# Patient Record
Sex: FEMALE | Race: BLACK OR AFRICAN AMERICAN | NOT HISPANIC OR LATINO | URBAN - METROPOLITAN AREA
[De-identification: names, ages, dates, MRNs, and addresses within clinical notes are randomized per-mention and may not be internally consistent; named-entity substitution may affect disease eponyms.]

---

## 2023-08-24 ENCOUNTER — OFFICE VISIT (OUTPATIENT)
Dept: PRIMARY CARE | Facility: CLINIC | Age: 58
End: 2023-08-24
Payer: COMMERCIAL

## 2023-08-24 VITALS
BODY MASS INDEX: 25.59 KG/M2 | HEART RATE: 68 BPM | HEIGHT: 64 IN | WEIGHT: 149.9 LBS | DIASTOLIC BLOOD PRESSURE: 74 MMHG | SYSTOLIC BLOOD PRESSURE: 116 MMHG

## 2023-08-24 DIAGNOSIS — E78.2 MIXED HYPERLIPIDEMIA: ICD-10-CM

## 2023-08-24 DIAGNOSIS — M81.0 AGE-RELATED OSTEOPOROSIS WITHOUT CURRENT PATHOLOGICAL FRACTURE: Primary | ICD-10-CM

## 2023-08-24 DIAGNOSIS — Z13.29 SCREENING FOR THYROID DISORDER: ICD-10-CM

## 2023-08-24 DIAGNOSIS — Z00.00 ROUTINE GENERAL MEDICAL EXAMINATION AT A HEALTH CARE FACILITY: ICD-10-CM

## 2023-08-24 DIAGNOSIS — R73.03 PREDIABETES: ICD-10-CM

## 2023-08-24 PROCEDURE — 1036F TOBACCO NON-USER: CPT | Performed by: INTERNAL MEDICINE

## 2023-08-24 PROCEDURE — 99386 PREV VISIT NEW AGE 40-64: CPT | Performed by: INTERNAL MEDICINE

## 2023-08-24 RX ORDER — CHOLECALCIFEROL (VITAMIN D3) 50 MCG
50 TABLET ORAL DAILY
COMMUNITY

## 2023-08-24 RX ORDER — CALCIUM CARBONATE 500(1250)
1 TABLET ORAL
COMMUNITY

## 2023-08-24 ASSESSMENT — ENCOUNTER SYMPTOMS
EYE ITCHING: 0
EYE PAIN: 0
TREMORS: 0
NUMBNESS: 0
PHOTOPHOBIA: 0
DIARRHEA: 0
DYSPHORIC MOOD: 0
COUGH: 0
UNEXPECTED WEIGHT CHANGE: 0
CONFUSION: 0
SORE THROAT: 0
NAUSEA: 0
AGITATION: 0
ADENOPATHY: 0
BLOOD IN STOOL: 0
HEADACHES: 0
HEMATURIA: 0
JOINT SWELLING: 0
SINUS PAIN: 0
FACIAL SWELLING: 0
POLYDIPSIA: 0
TROUBLE SWALLOWING: 0
ABDOMINAL PAIN: 0
STRIDOR: 0
ROS SKIN COMMENTS: DRY PATCHES
RECTAL PAIN: 0
APNEA: 0
SLEEP DISTURBANCE: 0
NECK STIFFNESS: 0
WHEEZING: 0
POLYPHAGIA: 0
CHILLS: 0
DYSURIA: 0
EYE DISCHARGE: 0
DIFFICULTY URINATING: 0
EYE REDNESS: 0
PALPITATIONS: 0
ACTIVITY CHANGE: 0
DIZZINESS: 0
NERVOUS/ANXIOUS: 0
FATIGUE: 0
WEAKNESS: 0
LIGHT-HEADEDNESS: 0
DECREASED CONCENTRATION: 0
MYALGIAS: 0
SINUS PRESSURE: 0
CHEST TIGHTNESS: 0
WOUND: 0
SPEECH DIFFICULTY: 0
ANAL BLEEDING: 0
BRUISES/BLEEDS EASILY: 0
SHORTNESS OF BREATH: 0
ABDOMINAL DISTENTION: 0
APPETITE CHANGE: 0
CONSTIPATION: 0
FEVER: 0
HYPERACTIVE: 0
FACIAL ASYMMETRY: 0
RHINORRHEA: 0
FREQUENCY: 0
HALLUCINATIONS: 0
ARTHRALGIAS: 0
VOICE CHANGE: 0
COLOR CHANGE: 0
BACK PAIN: 0
FLANK PAIN: 0
VOMITING: 0
NECK PAIN: 0
CHOKING: 0
SEIZURES: 0
DIAPHORESIS: 0

## 2023-08-24 NOTE — PROGRESS NOTES
Subjective   Patient ID: Nilsa Boyce is a 57 y.o. female who presents for Establish Care.    HPI    Pt here to reestablish care.  She is now a school counselor instead of a teacher.  She was a former patient of mine through [facility].  She has since seen another PCP.      She had bone density in June of 2023 and was found to have osteoporosis.  She was told to take calcium and vitamin D.  She had this ordered through GYN.      She had mammogram in June that was normal.  She has no breast issues.    She had colonoscopy done few years back by group at [facility].  No current GI issues.      Review of Systems   Constitutional:  Negative for activity change, appetite change, chills, diaphoresis, fatigue, fever and unexpected weight change.   HENT:  Positive for congestion and postnasal drip. Negative for dental problem, drooling, ear discharge, ear pain, facial swelling, hearing loss, mouth sores, nosebleeds, rhinorrhea, sinus pressure, sinus pain, sneezing, sore throat, tinnitus, trouble swallowing and voice change.    Eyes:  Positive for visual disturbance (wears glasses-up to date on exam). Negative for photophobia, pain, discharge, redness and itching.   Respiratory:  Negative for apnea, cough, choking, chest tightness, shortness of breath, wheezing and stridor.    Cardiovascular:  Negative for chest pain, palpitations and leg swelling.   Gastrointestinal:  Negative for abdominal distention, abdominal pain, anal bleeding, blood in stool, constipation, diarrhea, nausea, rectal pain and vomiting.   Endocrine: Negative for cold intolerance, heat intolerance, polydipsia, polyphagia and polyuria.   Genitourinary:  Negative for decreased urine volume, difficulty urinating, dyspareunia, dysuria, enuresis, flank pain, frequency, genital sores, hematuria, menstrual problem, pelvic pain, urgency, vaginal bleeding, vaginal discharge and vaginal pain.   Musculoskeletal:  Negative for arthralgias, back pain, gait problem,  "joint swelling, myalgias, neck pain and neck stiffness.   Skin:  Negative for color change, pallor, rash and wound.        Dry patches    Allergic/Immunologic: Negative for environmental allergies, food allergies and immunocompromised state.   Neurological:  Negative for dizziness, tremors, seizures, syncope, facial asymmetry, speech difficulty, weakness, light-headedness, numbness and headaches.   Hematological:  Negative for adenopathy. Does not bruise/bleed easily.   Psychiatric/Behavioral:  Negative for agitation, behavioral problems, confusion, decreased concentration, dysphoric mood, hallucinations, self-injury, sleep disturbance and suicidal ideas. The patient is not nervous/anxious and is not hyperactive.        Objective   /74 (BP Location: Left arm, Patient Position: Sitting)   Pulse 68   Ht 1.626 m (5' 4\")   Wt 68 kg (149 lb 14.4 oz)   BMI 25.73 kg/m²    Physical Exam  Constitutional:       Appearance: Normal appearance.   HENT:      Head: Normocephalic and atraumatic.      Right Ear: Tympanic membrane, ear canal and external ear normal. There is no impacted cerumen.      Left Ear: Tympanic membrane, ear canal and external ear normal. There is no impacted cerumen.      Nose: Nose normal. No congestion or rhinorrhea.      Mouth/Throat:      Mouth: Mucous membranes are moist.      Pharynx: Oropharynx is clear. No oropharyngeal exudate or posterior oropharyngeal erythema.   Eyes:      Extraocular Movements: Extraocular movements intact.      Conjunctiva/sclera: Conjunctivae normal.      Pupils: Pupils are equal, round, and reactive to light.   Neck:      Vascular: No carotid bruit.   Cardiovascular:      Rate and Rhythm: Normal rate and regular rhythm.      Pulses: Normal pulses.      Heart sounds: Normal heart sounds. No murmur heard.  Pulmonary:      Effort: Pulmonary effort is normal. No respiratory distress.      Breath sounds: Normal breath sounds. No wheezing, rhonchi or rales.   Abdominal: "      General: Abdomen is flat. Bowel sounds are normal. There is no distension.      Palpations: Abdomen is soft.      Tenderness: There is no abdominal tenderness.      Hernia: No hernia is present.   Musculoskeletal:         General: No swelling or tenderness. Normal range of motion.      Cervical back: Normal range of motion and neck supple.      Right lower leg: No edema.      Left lower leg: No edema.   Lymphadenopathy:      Cervical: No cervical adenopathy.   Skin:     General: Skin is warm and dry.      Findings: No lesion or rash.   Neurological:      General: No focal deficit present.      Mental Status: She is alert and oriented to person, place, and time.      Cranial Nerves: No cranial nerve deficit.      Sensory: No sensory deficit.      Motor: No weakness.   Psychiatric:         Mood and Affect: Mood normal.         Behavior: Behavior normal.         Thought Content: Thought content normal.         Judgment: Judgment normal.         Assessment/Plan   Problem List Items Addressed This Visit       Age-related osteoporosis without current pathological fracture - Primary     Bone density was in June of 2023 and noted to have osteoporosis  Only told to take calcium and vitamin d  Discussed use of Bisphosphonates; she will consider  Weight based exercises advised   Believes she went through menopause early          Relevant Orders    Vitamin D 25-Hydroxy,Total (for eval of Vitamin D levels)    Prediabetes     Trying to regulate with diet          Relevant Orders    Hemoglobin A1C    Mixed hyperlipidemia    Relevant Orders    Lipid Panel    Routine general medical examination at a health care facility     We will work to get copies of colonoscopy  Encouraged her to continue healthy diet and exercise  Sees GYN annually   Up to date on mammogram          Relevant Orders    Comprehensive Metabolic Panel    CBC    Urinalysis with Reflex Microscopic    Follow Up In Primary Care - Health Maintenance     Other Visit  Diagnoses       Screening for thyroid disorder        Relevant Orders    TSH

## 2023-08-24 NOTE — PATIENT INSTRUCTIONS
Consider starting bisphosphonate therapy such as Fosamax due to osteoporosis (brittle bone); if you choose to start this please me know   Continue taking calcium 1200 mg/day and vitamin D 2000 units/day   Weight based exercises are helpful to bone health  Work hard on healthy diet and exercise regularly-goal of 150 minutes week  We will work to get records of colonoscopy   Continue to see Gyn annually and have mammograms done when due   Flu and covid boosters come fall if interested  Follow up here in 1 year-sooner as needed

## 2023-08-24 NOTE — ASSESSMENT & PLAN NOTE
We will work to get copies of colonoscopy  Encouraged her to continue healthy diet and exercise  Sees GYN annually   Up to date on mammogram

## 2023-08-24 NOTE — ASSESSMENT & PLAN NOTE
Bone density was in June of 2023 and noted to have osteoporosis  Only told to take calcium and vitamin d  Discussed use of Bisphosphonates; she will consider  Weight based exercises advised   Believes she went through menopause early

## 2023-10-16 PROBLEM — L30.9 ACUTE ECZEMA: Status: ACTIVE | Noted: 2019-01-11

## 2023-10-16 PROBLEM — F43.29 STRESS AND ADJUSTMENT REACTION: Status: ACTIVE | Noted: 2019-01-11

## 2023-10-16 PROBLEM — E78.5 DYSLIPIDEMIA: Status: ACTIVE | Noted: 2023-10-16

## 2023-10-16 PROBLEM — L29.9 GENERALIZED PRURITUS: Status: ACTIVE | Noted: 2019-01-11

## 2023-10-16 RX ORDER — TRIAMCINOLONE ACETONIDE 1 MG/G
CREAM TOPICAL EVERY 12 HOURS
COMMUNITY
End: 2024-01-09 | Stop reason: WASHOUT

## 2023-10-16 RX ORDER — NAPROXEN 500 MG/1
500 TABLET ORAL
COMMUNITY
End: 2024-01-09 | Stop reason: WASHOUT

## 2023-10-17 ENCOUNTER — CLINICAL SUPPORT (OUTPATIENT)
Dept: PRIMARY CARE | Facility: CLINIC | Age: 58
End: 2023-10-17
Payer: COMMERCIAL

## 2023-10-17 DIAGNOSIS — Z23 NEED FOR INFLUENZA VACCINATION: ICD-10-CM

## 2023-10-17 PROCEDURE — 90471 IMMUNIZATION ADMIN: CPT | Performed by: INTERNAL MEDICINE

## 2023-10-17 PROCEDURE — 90686 IIV4 VACC NO PRSV 0.5 ML IM: CPT | Performed by: INTERNAL MEDICINE

## 2023-10-19 ENCOUNTER — APPOINTMENT (OUTPATIENT)
Dept: PRIMARY CARE | Facility: CLINIC | Age: 58
End: 2023-10-19
Payer: COMMERCIAL

## 2023-12-06 ENCOUNTER — LAB (OUTPATIENT)
Dept: LAB | Facility: LAB | Age: 58
End: 2023-12-06
Payer: COMMERCIAL

## 2023-12-06 DIAGNOSIS — R73.03 PREDIABETES: ICD-10-CM

## 2023-12-06 DIAGNOSIS — M81.0 AGE-RELATED OSTEOPOROSIS WITHOUT CURRENT PATHOLOGICAL FRACTURE: ICD-10-CM

## 2023-12-06 DIAGNOSIS — E78.2 MIXED HYPERLIPIDEMIA: ICD-10-CM

## 2023-12-06 DIAGNOSIS — Z00.00 ROUTINE GENERAL MEDICAL EXAMINATION AT A HEALTH CARE FACILITY: ICD-10-CM

## 2023-12-06 DIAGNOSIS — Z13.29 SCREENING FOR THYROID DISORDER: ICD-10-CM

## 2023-12-06 LAB
25(OH)D3 SERPL-MCNC: 31 NG/ML (ref 30–100)
ALBUMIN SERPL BCP-MCNC: 4.3 G/DL (ref 3.4–5)
ALP SERPL-CCNC: 83 U/L (ref 33–110)
ALT SERPL W P-5'-P-CCNC: 23 U/L (ref 7–45)
ANION GAP SERPL CALC-SCNC: 15 MMOL/L (ref 10–20)
AST SERPL W P-5'-P-CCNC: 19 U/L (ref 9–39)
BILIRUB SERPL-MCNC: 0.6 MG/DL (ref 0–1.2)
BUN SERPL-MCNC: 15 MG/DL (ref 6–23)
CALCIUM SERPL-MCNC: 9.6 MG/DL (ref 8.6–10.6)
CHLORIDE SERPL-SCNC: 105 MMOL/L (ref 98–107)
CHOLEST SERPL-MCNC: 231 MG/DL (ref 0–199)
CHOLESTEROL/HDL RATIO: 3.9
CO2 SERPL-SCNC: 29 MMOL/L (ref 21–32)
CREAT SERPL-MCNC: 0.86 MG/DL (ref 0.5–1.05)
ERYTHROCYTE [DISTWIDTH] IN BLOOD BY AUTOMATED COUNT: 14.2 % (ref 11.5–14.5)
EST. AVERAGE GLUCOSE BLD GHB EST-MCNC: 117 MG/DL
GFR SERPL CREATININE-BSD FRML MDRD: 79 ML/MIN/1.73M*2
GLUCOSE SERPL-MCNC: 76 MG/DL (ref 74–99)
HBA1C MFR BLD: 5.7 %
HCT VFR BLD AUTO: 45.6 % (ref 36–46)
HDLC SERPL-MCNC: 60 MG/DL
HGB BLD-MCNC: 14.6 G/DL (ref 12–16)
LDLC SERPL CALC-MCNC: 156 MG/DL
MCH RBC QN AUTO: 28.6 PG (ref 26–34)
MCHC RBC AUTO-ENTMCNC: 32 G/DL (ref 32–36)
MCV RBC AUTO: 89 FL (ref 80–100)
NON HDL CHOLESTEROL: 171 MG/DL (ref 0–149)
NRBC BLD-RTO: 0 /100 WBCS (ref 0–0)
PLATELET # BLD AUTO: 266 X10*3/UL (ref 150–450)
POTASSIUM SERPL-SCNC: 4.5 MMOL/L (ref 3.5–5.3)
PROT SERPL-MCNC: 7.5 G/DL (ref 6.4–8.2)
RBC # BLD AUTO: 5.11 X10*6/UL (ref 4–5.2)
SODIUM SERPL-SCNC: 144 MMOL/L (ref 136–145)
TRIGL SERPL-MCNC: 75 MG/DL (ref 0–149)
TSH SERPL-ACNC: 1.49 MIU/L (ref 0.44–3.98)
VLDL: 15 MG/DL (ref 0–40)
WBC # BLD AUTO: 4.9 X10*3/UL (ref 4.4–11.3)

## 2023-12-06 PROCEDURE — 83036 HEMOGLOBIN GLYCOSYLATED A1C: CPT

## 2023-12-06 PROCEDURE — 36415 COLL VENOUS BLD VENIPUNCTURE: CPT

## 2023-12-06 PROCEDURE — 82306 VITAMIN D 25 HYDROXY: CPT

## 2023-12-06 PROCEDURE — 84443 ASSAY THYROID STIM HORMONE: CPT

## 2023-12-06 PROCEDURE — 85027 COMPLETE CBC AUTOMATED: CPT

## 2023-12-06 PROCEDURE — 80053 COMPREHEN METABOLIC PANEL: CPT

## 2023-12-06 PROCEDURE — 80061 LIPID PANEL: CPT

## 2023-12-06 PROCEDURE — 81003 URINALYSIS AUTO W/O SCOPE: CPT

## 2023-12-07 LAB
APPEARANCE UR: CLEAR
BILIRUB UR STRIP.AUTO-MCNC: NEGATIVE MG/DL
COLOR UR: YELLOW
GLUCOSE UR STRIP.AUTO-MCNC: NEGATIVE MG/DL
KETONES UR STRIP.AUTO-MCNC: NEGATIVE MG/DL
LEUKOCYTE ESTERASE UR QL STRIP.AUTO: NEGATIVE
NITRITE UR QL STRIP.AUTO: NEGATIVE
PH UR STRIP.AUTO: 5 [PH]
PROT UR STRIP.AUTO-MCNC: NEGATIVE MG/DL
RBC # UR STRIP.AUTO: NEGATIVE /UL
SP GR UR STRIP.AUTO: 1.01
UROBILINOGEN UR STRIP.AUTO-MCNC: <2 MG/DL

## 2023-12-08 ENCOUNTER — TELEPHONE (OUTPATIENT)
Dept: PRIMARY CARE | Facility: CLINIC | Age: 58
End: 2023-12-08
Payer: COMMERCIAL

## 2023-12-08 DIAGNOSIS — E78.2 MIXED HYPERLIPIDEMIA: ICD-10-CM

## 2023-12-11 ENCOUNTER — TELEPHONE (OUTPATIENT)
Dept: PRIMARY CARE | Facility: CLINIC | Age: 58
End: 2023-12-11
Payer: COMMERCIAL

## 2023-12-11 DIAGNOSIS — U07.1 COVID: Primary | ICD-10-CM

## 2023-12-11 NOTE — TELEPHONE ENCOUNTER
She tested positive for COVID yesterday, sore throat, chills, headache, started yesterday. Would like Paxlovid. Send to pharmacy.

## 2024-01-09 ENCOUNTER — OFFICE VISIT (OUTPATIENT)
Dept: PRIMARY CARE | Facility: CLINIC | Age: 59
End: 2024-01-09
Payer: COMMERCIAL

## 2024-01-09 VITALS — DIASTOLIC BLOOD PRESSURE: 78 MMHG | SYSTOLIC BLOOD PRESSURE: 118 MMHG | HEART RATE: 72 BPM

## 2024-01-09 DIAGNOSIS — H93.8X3 EAR POPPING, BILATERAL: ICD-10-CM

## 2024-01-09 DIAGNOSIS — R25.2 CRAMP OF TOE: ICD-10-CM

## 2024-01-09 DIAGNOSIS — D17.1 LIPOMA OF BACK: Primary | ICD-10-CM

## 2024-01-09 PROCEDURE — 99213 OFFICE O/P EST LOW 20 MIN: CPT | Performed by: INTERNAL MEDICINE

## 2024-01-09 PROCEDURE — 1036F TOBACCO NON-USER: CPT | Performed by: INTERNAL MEDICINE

## 2024-01-09 ASSESSMENT — ENCOUNTER SYMPTOMS
SINUS PRESSURE: 0
RHINORRHEA: 0
SINUS PAIN: 0

## 2024-01-09 NOTE — PROGRESS NOTES
Subjective   Patient ID: Nilsa Boyce is a 58 y.o. female who presents for Mass (Right upper back which was found end of December 2023 while getting a massage).    HPI  Pt here in acute visit.  She went for deep tissue massage in 12/2023 and her massage therapist noted a lump of the right upper back.  Pt was unaware of this.  She has no pain to area.    She does note some right toes that cramp up/lock up.  Lasts few seconds and then resolves.      She also has some popping of ears, feels as if she just got off a plane.  She noted this for last few weeks.        Review of Systems   HENT:  Negative for congestion, ear discharge, ear pain, postnasal drip, rhinorrhea, sinus pressure and sinus pain.         Ear popping    Musculoskeletal:         Cramping of foot/toes    Skin:         Back lesion        Objective   /78 (BP Location: Left arm, Patient Position: Sitting)   Pulse 72    Physical Exam  Constitutional:       Appearance: Normal appearance.   HENT:      Right Ear: Tympanic membrane normal.      Left Ear: Tympanic membrane normal.      Nose: Nose normal.      Mouth/Throat:      Mouth: Mucous membranes are dry.      Pharynx: Oropharynx is clear.   Musculoskeletal:         General: No swelling, tenderness, deformity or signs of injury. Normal range of motion.      Right lower leg: No edema.      Left lower leg: No edema.   Skin:     Comments: + quarter size lipoma noted upper back on right side that is freely mobile, soft, non tender    Neurological:      Mental Status: She is alert and oriented to person, place, and time.   Psychiatric:         Mood and Affect: Mood normal.         Assessment/Plan   Problem List Items Addressed This Visit       Lipoma of back - Primary     Explained to pt this is benign collection of fat subcutaneously  Nothing needs done unless it bothers her  Will just monitor         Ear popping, bilateral     Recommend flonase use over next few weeks         Cramp of toe      Stretch the area as able  No abnormalities noted on exam

## 2024-01-09 NOTE — ASSESSMENT & PLAN NOTE
Explained to pt this is benign collection of fat subcutaneously  Nothing needs done unless it bothers her  Will just monitor

## 2024-01-09 NOTE — PATIENT INSTRUCTIONS
Skin lesion on back is known as a lipoma-it is benign; we do nothing with it unless it grows and becomes bothersome   Toe cramping can occur when tendons are not adequately stretched, just work on stretching area as able  For ears start nasal corticosteroid such as Flonase-2 sprays each nostril twice a day for 1 week then can reduce to 1 time a day use and then as needed-due to pressure changes/sinuses with current weather variation  Follow up as needed

## 2024-01-26 ENCOUNTER — HOSPITAL ENCOUNTER (OUTPATIENT)
Dept: RADIOLOGY | Facility: CLINIC | Age: 59
Discharge: HOME | End: 2024-01-26
Payer: COMMERCIAL

## 2024-01-26 DIAGNOSIS — E78.2 MIXED HYPERLIPIDEMIA: ICD-10-CM

## 2024-01-26 PROCEDURE — 75571 CT HRT W/O DYE W/CA TEST: CPT

## 2024-08-20 ENCOUNTER — APPOINTMENT (OUTPATIENT)
Dept: PRIMARY CARE | Facility: CLINIC | Age: 59
End: 2024-08-20
Payer: COMMERCIAL

## 2024-08-20 VITALS
BODY MASS INDEX: 27.06 KG/M2 | HEIGHT: 64 IN | HEART RATE: 74 BPM | WEIGHT: 158.5 LBS | RESPIRATION RATE: 16 BRPM | DIASTOLIC BLOOD PRESSURE: 60 MMHG | SYSTOLIC BLOOD PRESSURE: 102 MMHG

## 2024-08-20 DIAGNOSIS — E78.2 MIXED HYPERLIPIDEMIA: ICD-10-CM

## 2024-08-20 DIAGNOSIS — R35.0 URINARY FREQUENCY: ICD-10-CM

## 2024-08-20 DIAGNOSIS — K59.00 CONSTIPATION, UNSPECIFIED CONSTIPATION TYPE: ICD-10-CM

## 2024-08-20 DIAGNOSIS — Z12.11 SCREENING FOR COLORECTAL CANCER: ICD-10-CM

## 2024-08-20 DIAGNOSIS — Z12.12 SCREENING FOR COLORECTAL CANCER: ICD-10-CM

## 2024-08-20 DIAGNOSIS — Z00.00 ROUTINE GENERAL MEDICAL EXAMINATION AT A HEALTH CARE FACILITY: Primary | ICD-10-CM

## 2024-08-20 DIAGNOSIS — M81.0 AGE-RELATED OSTEOPOROSIS WITHOUT CURRENT PATHOLOGICAL FRACTURE: ICD-10-CM

## 2024-08-20 DIAGNOSIS — R73.03 PREDIABETES: ICD-10-CM

## 2024-08-20 DIAGNOSIS — Z13.29 SCREENING FOR THYROID DISORDER: ICD-10-CM

## 2024-08-20 DIAGNOSIS — E66.3 OVERWEIGHT WITH BODY MASS INDEX (BMI) OF 27 TO 27.9 IN ADULT: ICD-10-CM

## 2024-08-20 PROBLEM — R25.2 CRAMP OF TOE: Status: RESOLVED | Noted: 2024-01-09 | Resolved: 2024-08-20

## 2024-08-20 PROBLEM — L29.9 GENERALIZED PRURITUS: Status: RESOLVED | Noted: 2019-01-11 | Resolved: 2024-08-20

## 2024-08-20 PROBLEM — F43.29 STRESS AND ADJUSTMENT REACTION: Status: RESOLVED | Noted: 2019-01-11 | Resolved: 2024-08-20

## 2024-08-20 PROBLEM — L30.9 ACUTE ECZEMA: Status: RESOLVED | Noted: 2019-01-11 | Resolved: 2024-08-20

## 2024-08-20 PROCEDURE — 1036F TOBACCO NON-USER: CPT | Performed by: INTERNAL MEDICINE

## 2024-08-20 PROCEDURE — 99396 PREV VISIT EST AGE 40-64: CPT | Performed by: INTERNAL MEDICINE

## 2024-08-20 PROCEDURE — 3008F BODY MASS INDEX DOCD: CPT | Performed by: INTERNAL MEDICINE

## 2024-08-20 ASSESSMENT — ENCOUNTER SYMPTOMS
APNEA: 0
SORE THROAT: 0
NUMBNESS: 0
RECTAL PAIN: 0
JOINT SWELLING: 0
TREMORS: 0
CHOKING: 0
ABDOMINAL PAIN: 0
DIAPHORESIS: 0
DIZZINESS: 0
CONFUSION: 0
ADENOPATHY: 0
MYALGIAS: 0
SLEEP DISTURBANCE: 0
STRIDOR: 0
EYE ITCHING: 0
APPETITE CHANGE: 0
HALLUCINATIONS: 0
CONSTIPATION: 1
EYE PAIN: 0
SEIZURES: 0
DYSPHORIC MOOD: 0
LIGHT-HEADEDNESS: 0
SINUS PAIN: 0
VOMITING: 0
PHOTOPHOBIA: 0
NERVOUS/ANXIOUS: 0
DIARRHEA: 0
HYPERACTIVE: 0
DECREASED CONCENTRATION: 0
FREQUENCY: 1
FLANK PAIN: 0
NECK PAIN: 1
HEADACHES: 0
DIFFICULTY URINATING: 0
WEAKNESS: 0
WOUND: 0
PALPITATIONS: 0
SINUS PRESSURE: 0
FEVER: 0
COLOR CHANGE: 0
FACIAL SWELLING: 0
SHORTNESS OF BREATH: 0
BRUISES/BLEEDS EASILY: 0
RHINORRHEA: 0
FATIGUE: 0
SPEECH DIFFICULTY: 0
ABDOMINAL DISTENTION: 0
ACTIVITY CHANGE: 0
HEMATURIA: 0
NECK STIFFNESS: 0
CHILLS: 0
EYE REDNESS: 0
BACK PAIN: 0
WHEEZING: 0
VOICE CHANGE: 0
ANAL BLEEDING: 0
AGITATION: 0
EYE DISCHARGE: 0
COUGH: 0
NAUSEA: 0
FACIAL ASYMMETRY: 0
UNEXPECTED WEIGHT CHANGE: 0
BLOOD IN STOOL: 0
DYSURIA: 0
ARTHRALGIAS: 1
CHEST TIGHTNESS: 0
TROUBLE SWALLOWING: 0
POLYPHAGIA: 0
POLYDIPSIA: 0

## 2024-08-20 ASSESSMENT — PROMIS GLOBAL HEALTH SCALE
EMOTIONAL_PROBLEMS: SOMETIMES
RATE_AVERAGE_FATIGUE: MILD
CARRYOUT_SOCIAL_ACTIVITIES: VERY GOOD
RATE_SOCIAL_SATISFACTION: VERY GOOD
RATE_GENERAL_HEALTH: VERY GOOD
RATE_MENTAL_HEALTH: GOOD
RATE_PHYSICAL_HEALTH: VERY GOOD
RATE_AVERAGE_PAIN: 1
CARRYOUT_PHYSICAL_ACTIVITIES: COMPLETELY
RATE_QUALITY_OF_LIFE: VERY GOOD

## 2024-08-20 NOTE — PATIENT INSTRUCTIONS
Start doing Metamucil daily to help move bowels more regularly and completely  Continue calcium and vitamin D for now for bone health, avoid large consumption of caffeine/alcohol as these strip bone further; exercise regularly   Get fasting blood work and urine done when able-orders are in-stop into any    Consider flu/covid boosters come fall if interested  See GYN annually and have mammogram as directed  Call and arrange to get back in to GI for repeat colonoscopy   Follow up in 1 year

## 2024-08-20 NOTE — ASSESSMENT & PLAN NOTE
Pt was noted to have -porosis of spine only  She was noted to have -penia in other two areas  For now will continue calcium and vitamin D but did discuss at length possibly need for bisphosphonate therapy in near future  Dexa will be due again in 2024  She denies early menopause/height loss/or family history of osteoporosis
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
## 2024-08-28 ENCOUNTER — APPOINTMENT (OUTPATIENT)
Dept: PRIMARY CARE | Facility: CLINIC | Age: 59
End: 2024-08-28
Payer: COMMERCIAL

## 2024-09-09 ENCOUNTER — TELEPHONE (OUTPATIENT)
Dept: PRIMARY CARE | Facility: CLINIC | Age: 59
End: 2024-09-09
Payer: COMMERCIAL

## 2024-09-09 DIAGNOSIS — U07.1 COVID: Primary | ICD-10-CM

## 2024-09-09 RX ORDER — NIRMATRELVIR AND RITONAVIR 300-100 MG
3 KIT ORAL 2 TIMES DAILY
Qty: 30 TABLET | Refills: 0 | Status: SHIPPED | OUTPATIENT
Start: 2024-09-09 | End: 2024-09-14

## 2024-09-09 NOTE — TELEPHONE ENCOUNTER
Blessingjunaid tested Positive for COVID yesterday morning, she has congestion, cough headache, sore throat runny nose, she is asking for Paxlovid.     Please advise

## 2024-09-09 NOTE — TELEPHONE ENCOUNTER
I sent it into pharmacy  May cause diarrhea  Some get rebound symptoms once they are done with use

## 2024-10-07 ENCOUNTER — LAB (OUTPATIENT)
Dept: LAB | Facility: LAB | Age: 59
End: 2024-10-07
Payer: COMMERCIAL

## 2024-10-07 DIAGNOSIS — Z13.29 SCREENING FOR THYROID DISORDER: ICD-10-CM

## 2024-10-07 DIAGNOSIS — Z00.00 ROUTINE GENERAL MEDICAL EXAMINATION AT A HEALTH CARE FACILITY: ICD-10-CM

## 2024-10-07 DIAGNOSIS — R73.03 PREDIABETES: ICD-10-CM

## 2024-10-07 DIAGNOSIS — E78.2 MIXED HYPERLIPIDEMIA: ICD-10-CM

## 2024-10-07 DIAGNOSIS — R35.0 URINARY FREQUENCY: ICD-10-CM

## 2024-10-07 LAB
ALBUMIN SERPL BCP-MCNC: 4.4 G/DL (ref 3.4–5)
ALP SERPL-CCNC: 80 U/L (ref 33–110)
ALT SERPL W P-5'-P-CCNC: 25 U/L (ref 7–45)
ANION GAP SERPL CALC-SCNC: 9 MMOL/L (ref 10–20)
AST SERPL W P-5'-P-CCNC: 21 U/L (ref 9–39)
BILIRUB SERPL-MCNC: 0.5 MG/DL (ref 0–1.2)
BUN SERPL-MCNC: 15 MG/DL (ref 6–23)
CALCIUM SERPL-MCNC: 9.7 MG/DL (ref 8.6–10.6)
CHLORIDE SERPL-SCNC: 107 MMOL/L (ref 98–107)
CHOLEST SERPL-MCNC: 224 MG/DL (ref 0–199)
CHOLESTEROL/HDL RATIO: 4
CO2 SERPL-SCNC: 28 MMOL/L (ref 21–32)
CREAT SERPL-MCNC: 0.82 MG/DL (ref 0.5–1.05)
EGFRCR SERPLBLD CKD-EPI 2021: 84 ML/MIN/1.73M*2
ERYTHROCYTE [DISTWIDTH] IN BLOOD BY AUTOMATED COUNT: 14.1 % (ref 11.5–14.5)
EST. AVERAGE GLUCOSE BLD GHB EST-MCNC: 126 MG/DL
GLUCOSE SERPL-MCNC: 94 MG/DL (ref 74–99)
HBA1C MFR BLD: 6 %
HCT VFR BLD AUTO: 42.9 % (ref 36–46)
HDLC SERPL-MCNC: 56.3 MG/DL
HGB BLD-MCNC: 14.5 G/DL (ref 12–16)
LDLC SERPL CALC-MCNC: 147 MG/DL
MCH RBC QN AUTO: 28.7 PG (ref 26–34)
MCHC RBC AUTO-ENTMCNC: 33.8 G/DL (ref 32–36)
MCV RBC AUTO: 85 FL (ref 80–100)
NON HDL CHOLESTEROL: 168 MG/DL (ref 0–149)
NRBC BLD-RTO: 0 /100 WBCS (ref 0–0)
PLATELET # BLD AUTO: 267 X10*3/UL (ref 150–450)
POTASSIUM SERPL-SCNC: 4 MMOL/L (ref 3.5–5.3)
PROT SERPL-MCNC: 7.1 G/DL (ref 6.4–8.2)
RBC # BLD AUTO: 5.06 X10*6/UL (ref 4–5.2)
SODIUM SERPL-SCNC: 140 MMOL/L (ref 136–145)
TRIGL SERPL-MCNC: 103 MG/DL (ref 0–149)
TSH SERPL-ACNC: 2.15 MIU/L (ref 0.44–3.98)
VLDL: 21 MG/DL (ref 0–40)
WBC # BLD AUTO: 5.1 X10*3/UL (ref 4.4–11.3)

## 2024-10-07 PROCEDURE — 84443 ASSAY THYROID STIM HORMONE: CPT

## 2024-10-07 PROCEDURE — 80053 COMPREHEN METABOLIC PANEL: CPT

## 2024-10-07 PROCEDURE — 80061 LIPID PANEL: CPT

## 2024-10-07 PROCEDURE — 81003 URINALYSIS AUTO W/O SCOPE: CPT

## 2024-10-07 PROCEDURE — 36415 COLL VENOUS BLD VENIPUNCTURE: CPT

## 2024-10-07 PROCEDURE — 83036 HEMOGLOBIN GLYCOSYLATED A1C: CPT

## 2024-10-07 PROCEDURE — 85027 COMPLETE CBC AUTOMATED: CPT

## 2024-10-08 LAB
APPEARANCE UR: ABNORMAL
BILIRUB UR STRIP.AUTO-MCNC: NEGATIVE MG/DL
COLOR UR: ABNORMAL
GLUCOSE UR STRIP.AUTO-MCNC: NORMAL MG/DL
KETONES UR STRIP.AUTO-MCNC: NEGATIVE MG/DL
LEUKOCYTE ESTERASE UR QL STRIP.AUTO: NEGATIVE
NITRITE UR QL STRIP.AUTO: NEGATIVE
PH UR STRIP.AUTO: 5.5 [PH]
PROT UR STRIP.AUTO-MCNC: NEGATIVE MG/DL
RBC # UR STRIP.AUTO: NEGATIVE /UL
SP GR UR STRIP.AUTO: 1.02
UROBILINOGEN UR STRIP.AUTO-MCNC: NORMAL MG/DL

## 2025-08-08 ENCOUNTER — TELEPHONE (OUTPATIENT)
Dept: PRIMARY CARE | Facility: CLINIC | Age: 60
End: 2025-08-08
Payer: COMMERCIAL

## 2025-08-22 ENCOUNTER — APPOINTMENT (OUTPATIENT)
Dept: PRIMARY CARE | Facility: CLINIC | Age: 60
End: 2025-08-22
Payer: COMMERCIAL

## 2025-09-02 ENCOUNTER — APPOINTMENT (OUTPATIENT)
Dept: PRIMARY CARE | Facility: CLINIC | Age: 60
End: 2025-09-02
Payer: COMMERCIAL

## 2025-09-02 PROBLEM — M25.561 CHRONIC PAIN OF BOTH KNEES: Status: ACTIVE | Noted: 2025-09-02

## 2025-09-02 PROBLEM — M25.562 CHRONIC PAIN OF BOTH KNEES: Status: ACTIVE | Noted: 2025-09-02

## 2025-09-02 PROBLEM — G89.29 CHRONIC PAIN OF BOTH KNEES: Status: ACTIVE | Noted: 2025-09-02

## 2025-09-02 ASSESSMENT — ENCOUNTER SYMPTOMS
HEADACHES: 0
NAUSEA: 0
ADENOPATHY: 0
BACK PAIN: 0
APPETITE CHANGE: 0
PHOTOPHOBIA: 0
ARTHRALGIAS: 1
WEAKNESS: 0
SPEECH DIFFICULTY: 0
ACTIVITY CHANGE: 0
COLOR CHANGE: 0
BRUISES/BLEEDS EASILY: 0
EYE ITCHING: 0
SINUS PAIN: 0
DIARRHEA: 0
COUGH: 0
CONSTIPATION: 0
DYSURIA: 0
ABDOMINAL PAIN: 0
ANAL BLEEDING: 0
NECK PAIN: 0
FLANK PAIN: 0
FEVER: 0
FACIAL ASYMMETRY: 0
WOUND: 0
HEMATURIA: 0
RHINORRHEA: 0
DYSPHORIC MOOD: 0
DIZZINESS: 0
WHEEZING: 0
CHILLS: 0
BLOOD IN STOOL: 0
HYPERACTIVE: 0
STRIDOR: 0
EYE PAIN: 0
ABDOMINAL DISTENTION: 0
SLEEP DISTURBANCE: 1
RECTAL PAIN: 0
LIGHT-HEADEDNESS: 0
POLYPHAGIA: 0
MYALGIAS: 0
TROUBLE SWALLOWING: 0
NECK STIFFNESS: 0
PALPITATIONS: 0
FREQUENCY: 0
APNEA: 0
DIAPHORESIS: 0
VOICE CHANGE: 0
DIFFICULTY URINATING: 0
CONFUSION: 0
NUMBNESS: 0
CHEST TIGHTNESS: 0
CHOKING: 0
EYE REDNESS: 0
HALLUCINATIONS: 0
VOMITING: 0
AGITATION: 0
UNEXPECTED WEIGHT CHANGE: 0
TREMORS: 0
JOINT SWELLING: 0
SORE THROAT: 0
FATIGUE: 0
SEIZURES: 0
EYE DISCHARGE: 0
SHORTNESS OF BREATH: 1
POLYDIPSIA: 0
FACIAL SWELLING: 0
DECREASED CONCENTRATION: 0
NERVOUS/ANXIOUS: 0
SINUS PRESSURE: 0

## 2025-09-02 ASSESSMENT — PATIENT HEALTH QUESTIONNAIRE - PHQ9
9. THOUGHTS THAT YOU WOULD BE BETTER OFF DEAD, OR OF HURTING YOURSELF: NOT AT ALL
7. TROUBLE CONCENTRATING ON THINGS, SUCH AS READING THE NEWSPAPER OR WATCHING TELEVISION: MORE THAN HALF THE DAYS
6. FEELING BAD ABOUT YOURSELF - OR THAT YOU ARE A FAILURE OR HAVE LET YOURSELF OR YOUR FAMILY DOWN: SEVERAL DAYS
SUM OF ALL RESPONSES TO PHQ QUESTIONS 1-9: 14
8. MOVING OR SPEAKING SO SLOWLY THAT OTHER PEOPLE COULD HAVE NOTICED. OR THE OPPOSITE, BEING SO FIGETY OR RESTLESS THAT YOU HAVE BEEN MOVING AROUND A LOT MORE THAN USUAL: SEVERAL DAYS
2. FEELING DOWN, DEPRESSED OR HOPELESS: SEVERAL DAYS
5. POOR APPETITE OR OVEREATING: NEARLY EVERY DAY
3. TROUBLE FALLING OR STAYING ASLEEP OR SLEEPING TOO MUCH: NEARLY EVERY DAY
1. LITTLE INTEREST OR PLEASURE IN DOING THINGS: SEVERAL DAYS
4. FEELING TIRED OR HAVING LITTLE ENERGY: MORE THAN HALF THE DAYS
SUM OF ALL RESPONSES TO PHQ9 QUESTIONS 1 AND 2: 2

## 2026-09-04 ENCOUNTER — APPOINTMENT (OUTPATIENT)
Dept: PRIMARY CARE | Facility: CLINIC | Age: 61
End: 2026-09-04
Payer: COMMERCIAL